# Patient Record
Sex: FEMALE | Race: WHITE
[De-identification: names, ages, dates, MRNs, and addresses within clinical notes are randomized per-mention and may not be internally consistent; named-entity substitution may affect disease eponyms.]

---

## 2017-04-19 ENCOUNTER — HOSPITAL ENCOUNTER (OUTPATIENT)
Dept: HOSPITAL 45 - C.MAMM | Age: 67
Discharge: HOME | End: 2017-04-19
Attending: OBSTETRICS & GYNECOLOGY
Payer: COMMERCIAL

## 2017-04-19 DIAGNOSIS — Z12.31: Primary | ICD-10-CM

## 2017-04-19 DIAGNOSIS — R92.1: ICD-10-CM

## 2017-04-19 NOTE — MAMMOGRAPHY REPORT
BILATERAL DIGITAL SCREENING MAMMOGRAM WITH CAD: 4/19/2017

CLINICAL HISTORY: Routine screening.  Patient has no complaints.  





TECHNIQUE:  Current study was also evaluated with a Computer Aided Detection (CAD) system.  Bilatera
l CC and MLO views were obtained.  



COMPARISON: Comparison is made to exams dated:  3/28/2016 mammogram, 3/16/2015 mammogram, 1/30/2014 
mammogram, 1/28/2013 mammogram, 1/25/2012 mammogram, and 1/24/2011 mammogram - [facility].   



BREAST COMPOSITION:  There are scattered areas of fibroglandular density in both breasts.  



FINDINGS: There are possible increasing calcifications within the left upper outer quadrant, for whi
ch spot magnification views are recommended for further evaluation.



The remainder of both breasts are stable compared to prior exams, without suspicious masses, calcifi
cations, or areas of architectural distortion noted.  Bilateral asymmetries/benign-appearing masses 
are not significantly changed.





IMPRESSION:  ACR BI-RADS CATEGORY 0: INCOMPLETE EVALUATION:  NEED ADDITIONAL IMAGING EVALUATION

Left upper outer quadrant calcifications, for which additional imaging evaluation is recommended.  T
he patient will be called to schedule an appointment.  





Approximately 10% of breast cancers are not detected with mammography. A negative mammographic repor
t should not delay biopsy if a clinically suggestive mass is present.



Katherine Kearns M.D.          

/:4/19/2017 16:27:54  



Imaging Technologist: Shannon Dimas RT(R)(M), [facility]

letter sent: Addl Imaging 0  

BI-RADS Code: ACR BI-RADS Category 0: Incomplete Evaluation:  Need Additional Imaging Evaluation

## 2017-05-01 ENCOUNTER — HOSPITAL ENCOUNTER (OUTPATIENT)
Dept: HOSPITAL 45 - C.MAMM | Age: 67
Discharge: HOME | End: 2017-05-01
Attending: OBSTETRICS & GYNECOLOGY
Payer: COMMERCIAL

## 2017-05-01 DIAGNOSIS — R92.0: Primary | ICD-10-CM

## 2017-05-04 NOTE — MAMMOGRAPHY REPORT
UNILATERAL LEFT DIGITAL DIAGNOSTIC MAMMOGRAM: 5/1/2017

CLINICAL HISTORY: 66-year-old woman called back from screening mammography for clustered microcalcif
ications in the left upper outer quadrant.  





TECHNIQUE:  Spot magnification left CC and ML views were obtained.



COMPARISON: Comparison is made to exams dated:  4/19/2017 mammogram, 3/28/2016 mammogram, 3/16/2015 
mammogram, 1/30/2014 mammogram, 10/29/2013 ultrasound, and 10/29/2013 mammogram - [facility].   



BREAST COMPOSITION:  There are scattered areas of fibroglandular density in the left breast.  



FINDINGS:  There is an 11 mm cluster of pleomorphic microcalcifications in the upper outer middle on
e third of the left breast.  These appear increased compared to prior available mammograms and are t
herefore indeterminate.  Definitive characterization with a stereotactic guided biopsy is recommende
d.  There are mild vascular calcifications in the visualized left breast.  A previously observed mas
s in the left upper outer middle one third of the breast has decreased comparing to the 2008 mammogr
ams.



IMPRESSION:  ACR BI-RADS CATEGORY 4B: INTERMEDIATE SUSPICION FOR MALIGNANCY

1.  Left breast stereotactic guided biopsy is recommended for an 11 mm cluster of pleomorphic microc
alcifications in the upper outer quadrant of the left breast.



These results and recommendations were discussed with the patient at the time of the exam.  She tent
atively scheduled the biopsy prior to leaving our department.



Approximately 10% of breast cancers are not detected with mammography. A negative mammographic repor
t should not delay biopsy if a clinically suggestive mass is present.



Zuleyka Diggs M.D.          

ay/:5/1/2017 12:18:41  



Imaging Technologist: Rosa Isela SUÁREZ(BETTY)(ALBAN), [facility]

letter sent: Abnormal 4/5  

BI-RADS Code: ACR BI-RADS Category 4B: Intermediate Suspicion For Malignancy

## 2017-05-05 ENCOUNTER — HOSPITAL ENCOUNTER (OUTPATIENT)
Dept: HOSPITAL 45 - C.MAMM | Age: 67
Discharge: HOME | End: 2017-05-05
Attending: OBSTETRICS & GYNECOLOGY
Payer: COMMERCIAL

## 2017-05-05 DIAGNOSIS — R92.1: Primary | ICD-10-CM

## 2017-05-05 NOTE — MAMMOGRAPHY REPORT
STEREOTACTIC GUIDED BIOPSY LEFT BREAST: 5/5/2017

CLINICAL HISTORY: Indeterminate calcifications in the left upper outer quadrant.  



PATIENT CONSENT: The procedure, risks, benefits, and alternatives of stereotactic biopsy with clip p
lacement were discussed with the patient, and verbal and written consent was obtained.  The increase
d risk of bleeding as the patient is on Coumadin was discussed.  A timeout was performed immediately
 prior to the procedure.  



PROCEDURE DESCRIPTION: With stereotactic guidance, aseptic technique, and lidocaine as a local anest
hetic (1% lidocaine to anesthetize the skin and 1% lidocaine with epinephrine to anesthetize the juliano
per tissues), the calcifications of concern in the left upper outer quadrant were sampled multiple t
imes with a 9-gauge vacuum-assisted biopsy needle ([employer]).  The path of approach was craniocau
annabelle.  The specimen radiograph demonstrates calcifications to be present in the samples.  A metallic 
marker clip was placed at the biopsy site.  This was confirmed on postprocedure mammograms.  Direct 
pressure was applied at the biopsy site and hemostasis was readily achieved.  The patient tolerated 
the procedure without complication.  She was given wound care instructions.  





COMPARISON: Comparison is made to exams dated:  4/19/2017 mammogram, 5/1/2017 mammogram, 3/28/2016 m
ammogram, 3/16/2015 mammogram, and 1/30/2014 mammogram - [facility].   







IMPRESSION: STEREOTACTIC GUIDED BIOPSY

Stereotactic biopsy of indeterminate calcifications in the left upper outer quadrant, with clip plac
ement.  The patient will receive pathology results from her referring provider.



Katherine Kearns M.D.  

ah/:5/5/2017 13:13:08  



Attending Technologist: Rosa Isela MARY)(AL[place]AN), [facility]

Imaging Technologist: Imani Venegas, [facility]

## 2017-05-05 NOTE — DISCHARGE INSTRUCTIONS
Discharge Instructions


Procedure


Procedure Date:


May 5, 2017.


Reason for visit:


Left Calcifications.





Discharge


Discharge Date:


May 5, 2017.


Discharge Diagnosis:


status post breast biopsy





Instructions


Activity Recommendations:  Additional Limitations (see below)


Return to School/Work:  no limitations


Recommended Home Diet:  No Limitations


Provider Instructions:





ACTIVITY RECOMMENDATIONS:





*  No lifting, pushing, pulling or exercising the affected side for three days.








RETURN TO SCHOOL/WORK:





*  You may return to work/school after the procedure, but do not perform any 

strenuous


   activities for 24 to 48 hours.








MEDICATIONS:





*  Tylenol (two 325 mg) every four to six hours if needed for mild pain (if not 

allergic to Tylenol).








DIET:





*  Resume previous diet.








SPECIAL CARE INSTRUCTIONS:





*  Keep biopsy site dry for 24 hours.  May shower after 24 hours, but do not 

soak (bathe)


   incision.





*  May remove Tegaderm (plastic patch) tomorrow AFTER showering.





*  Leave the steri-strips on for one week.  Allow the steri-strips to fall off 

by themselves.  


   If not off after one week, you may remove them.  You may place a Bandaid


   crosswise over the strips, if desired.





*  Apply ice 10 minutes on and 10 minutes off as needed.





*  Wear a bra at bedtime to sleep more comfortably for 2-3 days.





*  Your referring physician should have the results after approximately 5 to 7 

business days.





*  Call for unusual bleeding, fever, drainage, etc or if you have any questions 

call


    [phone] during normal business hours or after hours call Dr Kearns, [phone].








FOLLOW UP VISIT:





Follow-up with Referring Physician as scheduled.





Allergies


Coded Allergies:  


     Cephalosporins (Verified  Allergy, Intermediate, RED, ITCHY, 6/12/16)


     Ciprofloxacin (Verified  Allergy, Unknown, UNKNOWN, 6/12/16)


Asiya Berry Recommendations:


 


Call your doctor if:


*  Temperature above 101 degrees


*  Pain not relieved by pain medicine ordered


*  There is increased drainage or redness from any incision


*  You have any unanswered questions or concerns.





Your Doctors Instructions noted above were prepared by provider Katherine Kearns.


Patient Signature Section:


 Patient Instructions Signature Page








Dia Thomas 











Patient (or Guardian) Signature/Date:____________________________________ I 

have read and understand the instructions given to me by my caregivers.








Caregiver/RN/Doctor Signature/Date:____________________________________








The above-named patient and/or guardian has received patient instructions on 

this date.


























+  Original Patient Signature Page (only) stays with chart.  Please make copy 

for patient.

## 2017-05-05 NOTE — MAMMOGRAPHY REPORT
UNILATERAL LEFT DIGITAL DIAGNOSTIC MAMMOGRAM: 5/5/2017

CLINICAL HISTORY: Status post left breast stereotactic biopsy.  





TECHNIQUE:  Left CC and ML views were obtained.



COMPARISON: Comparison is made to exams dated:  5/1/2017 mammogram, 4/19/2017 mammogram, 3/28/2016 m
ammogram, 3/16/2015 mammogram, and 1/30/2014 mammogram - [facility].   



BREAST COMPOSITION:  There are scattered areas of fibroglandular density in the left breast.  



FINDINGS:  Preprocedural left CC view was obtained for biopsy planning purposes.  Postprocedural lef
t CC and ML views were obtained post procedure, which shows a new biopsy marker clip at the site of 
the biopsied calcifications in the left upper outer quadrant.  A small hematoma is seen at the biops
y site, which measures approximately 2 cm.



IMPRESSION:  POST PROCEDURE IMAGING FOR MARKER PLACEMENT

New biopsy marker clip status post left breast stereotactic biopsy.  Pathology results are pending.



Approximately 10% of breast cancers are not detected with mammography. A negative mammographic repor
t should not delay biopsy if a clinically suggestive mass is present.



Katherine Kearns M.D.          

/:5/5/2017 13:23:42  



Imaging Technologist: Imani Venegas, [facility]



BI-RADS Code: Post Procedure Imaging For Marker Placement

## 2017-06-28 ENCOUNTER — HOSPITAL ENCOUNTER (OUTPATIENT)
Dept: HOSPITAL 45 - C.LAB | Age: 67
Discharge: HOME | End: 2017-06-28
Attending: INTERNAL MEDICINE
Payer: COMMERCIAL

## 2017-06-28 DIAGNOSIS — E78.5: Primary | ICD-10-CM

## 2017-06-28 LAB
ALT SERPL-CCNC: 54 U/L (ref 12–78)
AST SERPL-CCNC: 36 U/L (ref 15–37)
CHOLEST/HDLC SERPL: 5.6 {RATIO}
GLUCOSE UR QL: 47 MG/DL
KETONES UR QL STRIP: 158 MG/DL
NITRITE UR QL STRIP: 298 MG/DL (ref 0–150)
PH UR: 265 MG/DL (ref 0–200)
VERY LOW DENSITY LIPOPROT CALC: 60 MG/DL

## 2018-03-26 ENCOUNTER — HOSPITAL ENCOUNTER (OUTPATIENT)
Dept: HOSPITAL 45 - C.LAB | Age: 68
Discharge: HOME | End: 2018-03-26
Attending: INTERNAL MEDICINE
Payer: COMMERCIAL

## 2018-03-26 DIAGNOSIS — J45.909: ICD-10-CM

## 2018-03-26 DIAGNOSIS — M81.0: ICD-10-CM

## 2018-03-26 DIAGNOSIS — E78.5: ICD-10-CM

## 2018-03-26 DIAGNOSIS — I10: Primary | ICD-10-CM

## 2018-03-26 LAB
ALBUMIN SERPL-MCNC: 4 GM/DL (ref 3.4–5)
ALP SERPL-CCNC: 80 U/L (ref 45–117)
ALT SERPL-CCNC: 45 U/L (ref 12–78)
AST SERPL-CCNC: 28 U/L (ref 15–37)
BASOPHILS # BLD: 0.06 K/UL (ref 0–0.2)
BASOPHILS NFR BLD: 0.9 %
BUN SERPL-MCNC: 12 MG/DL (ref 7–18)
CALCIUM SERPL-MCNC: 9.2 MG/DL (ref 8.5–10.1)
CO2 SERPL-SCNC: 26 MMOL/L (ref 21–32)
CREAT SERPL-MCNC: 0.77 MG/DL (ref 0.6–1.2)
EOS ABS #: 0.3 K/UL (ref 0–0.5)
EOSINOPHIL NFR BLD AUTO: 297 K/UL (ref 130–400)
GLUCOSE SERPL-MCNC: 88 MG/DL (ref 70–99)
HCT VFR BLD CALC: 43.3 % (ref 37–47)
HGB BLD-MCNC: 14.3 G/DL (ref 12–16)
IG#: 0.01 K/UL (ref 0–0.02)
IMM GRANULOCYTES NFR BLD AUTO: 42.2 %
KETONES UR QL STRIP: 101 MG/DL
LYMPHOCYTES # BLD: 2.86 K/UL (ref 1.2–3.4)
MCH RBC QN AUTO: 29.9 PG (ref 25–34)
MCHC RBC AUTO-ENTMCNC: 33 G/DL (ref 32–36)
MCV RBC AUTO: 90.4 FL (ref 80–100)
MONO ABS #: 0.46 K/UL (ref 0.11–0.59)
MONOCYTES NFR BLD: 6.8 %
NEUT ABS #: 3.09 K/UL (ref 1.4–6.5)
NEUTROPHILS # BLD AUTO: 4.4 %
NEUTROPHILS NFR BLD AUTO: 45.6 %
PH UR: 218 MG/DL (ref 0–200)
PMV BLD AUTO: 9.3 FL (ref 7.4–10.4)
POTASSIUM SERPL-SCNC: 3.5 MMOL/L (ref 3.5–5.1)
PROT SERPL-MCNC: 7.7 GM/DL (ref 6.4–8.2)
RED CELL DISTRIBUTION WIDTH CV: 13.6 % (ref 11.5–14.5)
RED CELL DISTRIBUTION WIDTH SD: 45 FL (ref 36.4–46.3)
SODIUM SERPL-SCNC: 139 MMOL/L (ref 136–145)
WBC # BLD AUTO: 6.78 K/UL (ref 4.8–10.8)

## 2018-04-23 ENCOUNTER — HOSPITAL ENCOUNTER (OUTPATIENT)
Dept: HOSPITAL 45 - C.MAMM | Age: 68
Discharge: HOME | End: 2018-04-23
Attending: OBSTETRICS & GYNECOLOGY
Payer: COMMERCIAL

## 2018-04-23 DIAGNOSIS — Z12.31: Primary | ICD-10-CM

## 2018-04-24 NOTE — MAMMOGRAPHY REPORT
BILATERAL DIGITAL SCREENING MAMMOGRAM TOMOSYNTHESIS WITH CAD: 4/23/2018

CLINICAL HISTORY: Routine screening.  Patient has no complaints.  





TECHNIQUE:  Breast tomosynthesis in addition to standard 2D mammography was performed. Current study 
was also evaluated with a Computer Aided Detection (CAD) system.  



COMPARISON: Comparison is made to exams dated:  5/1/2017 mammogram, 4/19/2017 mammogram, 3/28/2016 ma
mmogram, 3/16/2015 mammogram, 1/30/2014 mammogram, and 1/28/2013 mammogram - [facility]
nter.   



BREAST COMPOSITION:  There are scattered areas of fibroglandular density in both breasts.  



FINDINGS: The nodularity bilaterally.  A stable dumbbell-shaped biopsy marker clip in the left upper 
outer quadrant.  Minimal vascular calcification in the breasts.  No new suspicious mass, architectura
l distortion or cluster of microcalcifications is seen.  



IMPRESSION:  ACR BI-RADS CATEGORY 1: NEGATIVE

There is no mammographic evidence of malignancy. A 1 year screening mammogram is recommended.  The pa
tient will receive written notification of the results.  





Approximately 10% of breast cancers are not detected with mammography. A negative mammographic report
 should not delay biopsy if a clinically suggestive mass is present.



Zuleyka Diggs M.D.          

ay/:4/23/2018 16:07:41  



Imaging Technologist: Shannon SUÁREZ(BETTY)(M), [facility]

letter sent: Normal 1/2  

BI-RADS Code: ACR BI-RADS Category 1: Negative